# Patient Record
Sex: FEMALE | Race: WHITE
[De-identification: names, ages, dates, MRNs, and addresses within clinical notes are randomized per-mention and may not be internally consistent; named-entity substitution may affect disease eponyms.]

---

## 2019-04-23 ENCOUNTER — HOSPITAL ENCOUNTER (EMERGENCY)
Dept: HOSPITAL 62 - ER | Age: 44
Discharge: HOME | End: 2019-04-23
Payer: COMMERCIAL

## 2019-04-23 VITALS — SYSTOLIC BLOOD PRESSURE: 155 MMHG | DIASTOLIC BLOOD PRESSURE: 107 MMHG

## 2019-04-23 DIAGNOSIS — R14.0: ICD-10-CM

## 2019-04-23 DIAGNOSIS — M79.602: ICD-10-CM

## 2019-04-23 DIAGNOSIS — R07.9: Primary | ICD-10-CM

## 2019-04-23 DIAGNOSIS — R10.10: ICD-10-CM

## 2019-04-23 DIAGNOSIS — R10.13: ICD-10-CM

## 2019-04-23 LAB
ADD MANUAL DIFF: NO
ALBUMIN SERPL-MCNC: 4 G/DL (ref 3.5–5)
ALP SERPL-CCNC: 92 U/L (ref 38–126)
ALT SERPL-CCNC: 27 U/L (ref 9–52)
ANION GAP SERPL CALC-SCNC: 7 MMOL/L (ref 5–19)
APPEARANCE UR: CLEAR
APTT PPP: YELLOW S
AST SERPL-CCNC: 23 U/L (ref 14–36)
BASOPHILS # BLD AUTO: 0.1 10^3/UL (ref 0–0.2)
BASOPHILS NFR BLD AUTO: 0.7 % (ref 0–2)
BILIRUB DIRECT SERPL-MCNC: 0.2 MG/DL (ref 0–0.4)
BILIRUB SERPL-MCNC: 0.4 MG/DL (ref 0.2–1.3)
BILIRUB UR QL STRIP: NEGATIVE
BUN SERPL-MCNC: 16 MG/DL (ref 7–20)
CALCIUM: 10.4 MG/DL (ref 8.4–10.2)
CHLORIDE SERPL-SCNC: 107 MMOL/L (ref 98–107)
CO2 SERPL-SCNC: 25 MMOL/L (ref 22–30)
EOSINOPHIL # BLD AUTO: 0.1 10^3/UL (ref 0–0.6)
EOSINOPHIL NFR BLD AUTO: 0.7 % (ref 0–6)
ERYTHROCYTE [DISTWIDTH] IN BLOOD BY AUTOMATED COUNT: 13.3 % (ref 11.5–14)
GLUCOSE SERPL-MCNC: 114 MG/DL (ref 75–110)
GLUCOSE UR STRIP-MCNC: NEGATIVE MG/DL
HCT VFR BLD CALC: 37.3 % (ref 36–47)
HGB BLD-MCNC: 12.8 G/DL (ref 12–15.5)
KETONES UR STRIP-MCNC: NEGATIVE MG/DL
LIPASE SERPL-CCNC: 76.8 U/L (ref 23–300)
LYMPHOCYTES # BLD AUTO: 2.5 10^3/UL (ref 0.5–4.7)
LYMPHOCYTES NFR BLD AUTO: 25 % (ref 13–45)
MCH RBC QN AUTO: 32.3 PG (ref 27–33.4)
MCHC RBC AUTO-ENTMCNC: 34.3 G/DL (ref 32–36)
MCV RBC AUTO: 94 FL (ref 80–97)
MONOCYTES # BLD AUTO: 0.6 10^3/UL (ref 0.1–1.4)
MONOCYTES NFR BLD AUTO: 6.1 % (ref 3–13)
NEUTROPHILS # BLD AUTO: 6.8 10^3/UL (ref 1.7–8.2)
NEUTS SEG NFR BLD AUTO: 67.5 % (ref 42–78)
NITRITE UR QL STRIP: NEGATIVE
PH UR STRIP: 5 [PH] (ref 5–9)
PLATELET # BLD: 227 10^3/UL (ref 150–450)
POTASSIUM SERPL-SCNC: 4.2 MMOL/L (ref 3.6–5)
PROT SERPL-MCNC: 7.6 G/DL (ref 6.3–8.2)
PROT UR STRIP-MCNC: NEGATIVE MG/DL
RBC # BLD AUTO: 3.95 10^6/UL (ref 3.72–5.28)
SODIUM SERPL-SCNC: 138.8 MMOL/L (ref 137–145)
SP GR UR STRIP: 1.02
TOTAL CELLS COUNTED % (AUTO): 100 %
UROBILINOGEN UR-MCNC: NEGATIVE MG/DL (ref ?–2)
WBC # BLD AUTO: 10.1 10^3/UL (ref 4–10.5)

## 2019-04-23 PROCEDURE — 93005 ELECTROCARDIOGRAM TRACING: CPT

## 2019-04-23 PROCEDURE — 36415 COLL VENOUS BLD VENIPUNCTURE: CPT

## 2019-04-23 PROCEDURE — 83690 ASSAY OF LIPASE: CPT

## 2019-04-23 PROCEDURE — 71046 X-RAY EXAM CHEST 2 VIEWS: CPT

## 2019-04-23 PROCEDURE — 76705 ECHO EXAM OF ABDOMEN: CPT

## 2019-04-23 PROCEDURE — 85025 COMPLETE CBC W/AUTO DIFF WBC: CPT

## 2019-04-23 PROCEDURE — 81001 URINALYSIS AUTO W/SCOPE: CPT

## 2019-04-23 PROCEDURE — 84484 ASSAY OF TROPONIN QUANT: CPT

## 2019-04-23 PROCEDURE — 93010 ELECTROCARDIOGRAM REPORT: CPT

## 2019-04-23 PROCEDURE — 99284 EMERGENCY DEPT VISIT MOD MDM: CPT

## 2019-04-23 PROCEDURE — 80053 COMPREHEN METABOLIC PANEL: CPT

## 2019-04-23 NOTE — ER DOCUMENT REPORT
ED General





- General


Chief Complaint: Chest Pain


Stated Complaint: BACK PAIN, CHEST PAIN, LEFT ARM PAIN


Time Seen by Provider: 04/23/19 18:55


Mode of Arrival: Ambulatory


Notes: 





Patient is a 44-year-old female without chronic medical problems, does not take 

daily medications who presents with 2-3 days of epigastric abdominal discomfort 

intermittently radiating up into her central chest and into her mid back.  

Describes the pain as being a mild, throbbing, burning, fullness to the upper 

abdomen.  Seems to be worsened by food intake.  Has not trying to improve her 

symptoms.  Relatively unchanged since onset.  Significant other at the bedside 

does relate that she has had symptoms of gastritis and reflux many times in the 

past.  Does note that she has been drinking a lot of orange juice as she was 

recently sick with a viral upper respiratory infection and wanted to use the or 

she is to help treat this infection.  She states that she became concerned today

when she began having some left upper extremity discomfort as well prompting her

to come to the emergency department.  She has no known cardiac history.  No 

history of DVT or pulmonary embolus.  Non-smoker.  Symptoms have improved 

without intervention here in the emergency department.  Has not seen her general

physician regarding today's concerns.


TRAVEL OUTSIDE OF THE [place] IN LAST 30 DAYS: No





- Related Data


Allergies/Adverse Reactions: 


                                        





erythromycin base Allergy (Verified 04/23/19 15:32)


   











Past Medical History





- General


Information source: Patient





- Social History


Smoking Status: Never Smoker


Frequency of alcohol use: None


Drug Abuse: None


Lives with: Spouse/Significant other


Family History: Reviewed & Not Pertinent


Patient has suicidal ideation: No


Patient has homicidal ideation: No


Renal/ Medical History: Denies: Hx Peritoneal Dialysis


Past Surgical History: Reports: Hx Orthopedic Surgery - knee





Review of Systems





- Review of Systems


Notes: 





Constitutional: Negative for fever.


HENT: Negative for sore throat.


Eyes: Negative for visual changes.


Cardiovascular: Positive for chest pain.


Respiratory: Negative for shortness of breath.


Gastrointestinal: Positive for upper abdominal pain and bloating


Genitourinary: Negative for dysuria.


Musculoskeletal: Positive for left upper extremity pain


Skin: Negative for rash.


Neurological: Negative for headaches, weakness or numbness.





10 point ROS negative except as marked above and in HPI.





Physical Exam





- Vital signs


Vitals: 


                                        











Temp Pulse Resp BP Pulse Ox


 


 98.5 F   91   18   167/97 H  100 


 


 04/23/19 15:44  04/23/19 15:44  04/23/19 15:44  04/23/19 15:44  04/23/19 15:44











Interpretation: Hypertensive


Notes: 





PHYSICAL EXAMINATION:





GENERAL: Well-appearing, well-nourished and in no acute distress.





HEAD: Atraumatic, normocephalic.





EYES: Pupils equal round and reactive to light, extraocular movements intact, 

sclera anicteric, conjunctiva are normal.





ENT: nares patent, oropharynx clear without exudates.  Moist mucous membranes.





NECK: Normal range of motion, supple without lymphadenopathy





LUNGS: Breath sounds clear to auscultation bilaterally and equal.  No wheezes 

rales or rhonchi.





HEART: Regular rate and rhythm without murmurs





ABDOMEN: Soft, nontender, normoactive bowel sounds.  No guarding, no rebound.  

No masses appreciated.





EXTREMITIES: Normal range of motion, no pitting or edema.  No cyanosis.





NEUROLOGICAL: No focal neurological deficits. Moves all extremities 

spontaneously and on command.





PSYCH: Normal mood, normal affect.





SKIN: Warm, Dry, normal turgor, no rashes or lesions noted.





Course





- Re-evaluation


Re-evalutation: 





04/23/19 21:54


Patient presents with epigastric abdominal pain with associated reflux symptoms 

most consistent with likely gastritis.  Patient has also had some intermittent 

left arm pain which did increase concern for possible ACS presentation.  

Thankfully heart score is less than 3, 2- troponins 3 hours apart, EKG is 

normal.  Patient has no focal abdominal tenderness on examination.  Right upper 

quadrant ultrasound does not demonstrate any evidence of acute cholecystitis and

clinical history not consistent with symptomatic cholelithiasis.  Lipase is 

normal.  Chest x-ray is clear.  No LFT changes.  Based on history and exam, I do

not suspect ACS, pulmonary embolus, SBO, mesenteric ischemia, acute 

pancreatitis, biliary pathology, or an abdominal aortic dissection.  At this 

time will discharge with return precautions and follow-up recommendations.  

Verbal discharge instructions given a the bedside and opportunity for questions 

given. Medication warnings reviewed. Patient is in agreement with this plan and 

has verbalized understanding of return precautions and the need for primary care

follow-up in the next 24-72 hours.





- Vital Signs


Vital signs: 


                                        











Temp Pulse Resp BP Pulse Ox


 


 98.1 F   91   14   155/107 H  98 


 


 04/23/19 23:14  04/23/19 15:44  04/23/19 23:11  04/23/19 23:11  04/23/19 23:11














- Laboratory


Result Diagrams: 


                                 04/23/19 19:25





                                 04/23/19 19:25


Laboratory results interpreted by me: 


                                        











  04/23/19 04/23/19





  19:25 19:25


 


Glucose  114 H 


 


Calcium  10.4 H 


 


Ur Leukocyte Esterase   TRACE H














- Diagnostic Test


Radiology reviewed: Image reviewed, Reports reviewed


Radiology results interpreted by me: 





04/23/19 21:55


Chest x-ray: No acute infiltrate or pneumothorax





- EKG Interpretation by Me


Additional EKG results interpreted by me: 





04/23/19 21:57


Sinus tachycardia, rate 105.  No ST elevations or depressions.  QTC is 466.





Discharge





- Discharge


Clinical Impression: 


 Upper abdominal pain, Left arm pain, Chest discomfort





Condition: Good


Disposition: HOME, SELF-CARE


Additional Instructions: 


Your symptoms appear to be most consistent with stomach or upper intestinal 

irritation.   Please begin taking famotidine 40 mg in the morning and 40 mg at 

night.  Take Carafate prior to meals.  You may also take medicine such as Pepto-

Bismol or Tums to assist with your pain.  Please return to emergency department 

immediately if you have worsening of your pain, shortness of breath, vomiting, 

become unable to exert yourself due to pain or difficulty breathing, you pass 

out, or have any pain that radiates into your arms, jaw, or back. Please also 

return if you have any additional symptoms that are concerning to you.





As we have discussed, the most important thing is lifestyle changes.  You need 

to avoid smoking, sodas, tea, coffee, alcohol, spicy foods, and acidic foods 

such as citrus fruits, tomato based products, berries, and most fruit juices. 


Prescriptions: 


Famotidine 40 mg PO BID #60 tablet


Sucralfate [Carafate 1 gm Tablet] 1 gm PO ACHS #120 tablet

## 2019-04-23 NOTE — RADIOLOGY REPORT (SQ)
EXAM DESCRIPTION: 



US ABDOMEN LIMITED



COMPLETED DATE/TME:  04/23/2019 18:56



CLINICAL HISTORY: 



44 years, Female, EPIGASTRIC PAIN



COMPARISON:

None.



TECHNIQUE:

Abdominal ultrasound was performed.



LIMITATIONS:

None.



FINDINGS:



Visualized portions of the pancreas appear normal in

echogenicity.



Abdominal aorta appears normal with measurements as follows:

Proximal abdominal aorta: 2.2 cm

Mid abdominal aorta: 1.6 cm

Distal abdominal aorta: 1.5 cm



The liver is diffusely echogenic. It measures 15 cm in length.

Antegrade flow is documented within the main portal vein.



Right kidney measures 9.5 cm in length. No hydronephrosis.



Gallbladder wall thickness measures 2 mm. Echogenic material

appears to layer dependently within the gallbladder lumen.

Sonographic Mc sign was negative. Common bile duct diameter

measures 5 mm.



IMPRESSION:



Suspected gallbladder sludge. Otherwise, no sonographic evidence

of acute cholecystitis.



Echogenic liver, suggestive of hepatic steatosis.

 



copyright 2011 [employer]- All Rights Reserved

## 2019-04-23 NOTE — EKG REPORT
SEVERITY:- OTHERWISE NORMAL ECG -

SINUS TACHYCARDIA

:

Confirmed by: Carlos German MD 23-Apr-2019 17:25:15

## 2019-04-23 NOTE — RADIOLOGY REPORT (SQ)
EXAM DESCRIPTION:  CHEST 2 VIEWS



COMPLETED DATE/TIME:  4/23/2019 7:13 pm



REASON FOR STUDY:  CHEST PAIN



COMPARISON:  None.



EXAM PARAMETERS:  NUMBER OF VIEWS: two views

TECHNIQUE: Digital Frontal and Lateral radiographic views of the chest acquired.

RADIATION DOSE: NA

LIMITATIONS: none



FINDINGS:  LUNGS AND PLEURA: No opacities, masses or pneumothorax. No pleural effusion.

MEDIASTINUM AND HILAR STRUCTURES: No masses or contour abnormalities.

HEART AND VASCULAR STRUCTURES: Heart normal size.  No evidence for failure.

BONES: No acute findings.

HARDWARE: None in the chest.

OTHER: No other significant finding.



IMPRESSION:  NO ACUTE RADIOGRAPHIC FINDING IN THE CHEST.



TECHNICAL DOCUMENTATION:  JOB ID:  [id number]

 2011 [employer]- All Rights Reserved



Reading location - IP/workstation name: [email or web address]

## 2019-04-23 NOTE — ER DOCUMENT REPORT
ED Medical Screen (RME)





- General


Chief Complaint: Chest Pain


Stated Complaint: BACK PAIN, CHEST PAIN, LEFT ARM PAIN


Time Seen by Provider: 04/23/19 18:55


Mode of Arrival: Ambulatory


Information source: Patient


TRAVEL OUTSIDE OF THE [place] IN LAST 30 DAYS: No





- HPI


Patient complains to provider of: CHEST PAIN, ABDO PAIN


Notes: 





04/23/19 18:59


Patient with complaints of chest pain and upper abdominal pain.  The pain has 

been present for about a week.  States the pain is whenever she eats and in her 

epigastric area and radiates into her left upper shoulder area and chest.  No 

shortness of breath.  No fever.  She has had nausea vomiting.  No prior 

abdominal surgeries.  She does have a prior history of an ulcer.  She is a non-s

moker.  No history of hypertension, hyper cholesterol, diabetes, CAD.  No drug 

use.





Exam


Nontoxic, no distress.  Epigastric tenderness to palpation.  Lungs clear 

throughout.  Heart sounds normal.





Plan


CBC, CMP, lipase, troponin, EKG, chest x-ray, ultrasound of the gallbladder.





An initial examination was made on the patient as part of the triage process, 

and it was determined a more comprehensive evaluation was necessary. Initial 

labs were ordered and patient was transferred to another provider in the ED who 

assumed care and finished evaluation and plan.








- Related Data


Allergies/Adverse Reactions: 


                                        





erythromycin base Allergy (Verified 04/23/19 15:32)


   











Past Medical History


Renal/ Medical History: Denies: Hx Peritoneal Dialysis


Past Surgical History: Reports: Hx Orthopedic Surgery - knee





Physical Exam





- Vital signs


Vitals: 





                                        











Temp Pulse Resp BP Pulse Ox


 


 98.5 F   91   18   167/97 H  100 


 


 04/23/19 15:44  04/23/19 15:44  04/23/19 15:44  04/23/19 15:44  04/23/19 15:44














Course





- Vital Signs


Vital signs: 





                                        











Temp Pulse Resp BP Pulse Ox


 


 98.5 F   91   18   167/97 H  100 


 


 04/23/19 15:44  04/23/19 15:44  04/23/19 15:44  04/23/19 15:44  04/23/19 15:44

## 2020-07-31 ENCOUNTER — HOSPITAL ENCOUNTER (EMERGENCY)
Dept: HOSPITAL 62 - ER | Age: 45
Discharge: HOME | End: 2020-07-31
Payer: SELF-PAY

## 2020-07-31 VITALS — DIASTOLIC BLOOD PRESSURE: 94 MMHG | SYSTOLIC BLOOD PRESSURE: 159 MMHG

## 2020-07-31 DIAGNOSIS — R10.812: ICD-10-CM

## 2020-07-31 DIAGNOSIS — R10.2: ICD-10-CM

## 2020-07-31 DIAGNOSIS — R11.2: ICD-10-CM

## 2020-07-31 DIAGNOSIS — M79.605: ICD-10-CM

## 2020-07-31 DIAGNOSIS — R31.9: ICD-10-CM

## 2020-07-31 DIAGNOSIS — N13.2: Primary | ICD-10-CM

## 2020-07-31 DIAGNOSIS — R10.814: ICD-10-CM

## 2020-07-31 DIAGNOSIS — R10.9: ICD-10-CM

## 2020-07-31 LAB
ADD MANUAL DIFF: NO
ALBUMIN SERPL-MCNC: 4.2 G/DL (ref 3.5–5)
ALP SERPL-CCNC: 101 U/L (ref 38–126)
ANION GAP SERPL CALC-SCNC: 6 MMOL/L (ref 5–19)
APPEARANCE UR: (no result)
APTT PPP: YELLOW S
AST SERPL-CCNC: 44 U/L (ref 14–36)
BASOPHILS # BLD AUTO: 0 10^3/UL (ref 0–0.2)
BASOPHILS NFR BLD AUTO: 0.6 % (ref 0–2)
BILIRUB DIRECT SERPL-MCNC: 0 MG/DL (ref 0–0.4)
BILIRUB SERPL-MCNC: 0.4 MG/DL (ref 0.2–1.3)
BILIRUB UR QL STRIP: NEGATIVE
BUN SERPL-MCNC: 13 MG/DL (ref 7–20)
CALCIUM: 10.4 MG/DL (ref 8.4–10.2)
CHLORIDE SERPL-SCNC: 106 MMOL/L (ref 98–107)
CO2 SERPL-SCNC: 28 MMOL/L (ref 22–30)
EOSINOPHIL # BLD AUTO: 0.1 10^3/UL (ref 0–0.6)
EOSINOPHIL NFR BLD AUTO: 2.2 % (ref 0–6)
ERYTHROCYTE [DISTWIDTH] IN BLOOD BY AUTOMATED COUNT: 12.7 % (ref 11.5–14)
GLUCOSE SERPL-MCNC: 133 MG/DL (ref 75–110)
GLUCOSE UR STRIP-MCNC: NEGATIVE MG/DL
HCT VFR BLD CALC: 40 % (ref 36–47)
HGB BLD-MCNC: 13.6 G/DL (ref 12–15.5)
KETONES UR STRIP-MCNC: NEGATIVE MG/DL
LYMPHOCYTES # BLD AUTO: 1.9 10^3/UL (ref 0.5–4.7)
LYMPHOCYTES NFR BLD AUTO: 27.7 % (ref 13–45)
MCH RBC QN AUTO: 32.4 PG (ref 27–33.4)
MCHC RBC AUTO-ENTMCNC: 34.1 G/DL (ref 32–36)
MCV RBC AUTO: 95 FL (ref 80–97)
MONOCYTES # BLD AUTO: 0.5 10^3/UL (ref 0.1–1.4)
MONOCYTES NFR BLD AUTO: 7.3 % (ref 3–13)
NEUTROPHILS # BLD AUTO: 4.2 10^3/UL (ref 1.7–8.2)
NEUTS SEG NFR BLD AUTO: 62.2 % (ref 42–78)
NITRITE UR QL STRIP: NEGATIVE
PH UR STRIP: 7 [PH] (ref 5–9)
PLATELET # BLD: 229 10^3/UL (ref 150–450)
POTASSIUM SERPL-SCNC: 4.4 MMOL/L (ref 3.6–5)
PROT SERPL-MCNC: 7.7 G/DL (ref 6.3–8.2)
PROT UR STRIP-MCNC: 30 MG/DL
RBC # BLD AUTO: 4.2 10^6/UL (ref 3.72–5.28)
SP GR UR STRIP: 1.01
TOTAL CELLS COUNTED % (AUTO): 100 %
UROBILINOGEN UR-MCNC: NEGATIVE MG/DL (ref ?–2)
WBC # BLD AUTO: 6.7 10^3/UL (ref 4–10.5)

## 2020-07-31 PROCEDURE — 36415 COLL VENOUS BLD VENIPUNCTURE: CPT

## 2020-07-31 PROCEDURE — 85025 COMPLETE CBC W/AUTO DIFF WBC: CPT

## 2020-07-31 PROCEDURE — 80053 COMPREHEN METABOLIC PANEL: CPT

## 2020-07-31 PROCEDURE — 96374 THER/PROPH/DIAG INJ IV PUSH: CPT

## 2020-07-31 PROCEDURE — 96375 TX/PRO/DX INJ NEW DRUG ADDON: CPT

## 2020-07-31 PROCEDURE — 84703 CHORIONIC GONADOTROPIN ASSAY: CPT

## 2020-07-31 PROCEDURE — 96361 HYDRATE IV INFUSION ADD-ON: CPT

## 2020-07-31 PROCEDURE — 81001 URINALYSIS AUTO W/SCOPE: CPT

## 2020-07-31 PROCEDURE — 99284 EMERGENCY DEPT VISIT MOD MDM: CPT

## 2020-07-31 PROCEDURE — 83690 ASSAY OF LIPASE: CPT

## 2020-07-31 PROCEDURE — 74176 CT ABD & PELVIS W/O CONTRAST: CPT

## 2020-07-31 NOTE — RADIOLOGY REPORT (SQ)
EXAM DESCRIPTION:  CT ABD/PELVIS NO ORAL OR IV



IMAGES COMPLETED DATE/TIME:  7/31/2020 9:49 am



REASON FOR STUDY:  Left flank pain, eval for kidney stone



COMPARISON:  None.



TECHNIQUE:  CT scan of the abdomen and pelvis performed without intravenous or oral contrast. Images 
reviewed with lung, soft tissue, and bone windows. Reconstructed coronal and sagittal MPR images revi
ewed. All images stored on PACS.

All CT scanners at this facility use dose modulation, iterative reconstruction, and/or weight based d
osing when appropriate to reduce radiation dose to as low as reasonably achievable (ALARA).

[facility]: Dose Right  [facility]: CareDose    [facility]: Dose Right    [facility]: Teradose 4D    [facility]: Smart Headstrong



RADIATION DOSE:  CT Rad equipment meets quality standard of care and radiation dose reduction techniq
ues were employed. CTDIvol: 7.4 mGy. DLP: 385 mGy-cm.mGy.



LIMITATIONS:  None.



FINDINGS:  LOWER CHEST: No significant findings. No nodules or infiltrates.

NON-CONTRASTED LIVER, SPLEEN, ADRENALS: Evaluation limited by lack of IV contrast. No identified sign
ificant masses.

PANCREAS: No masses. No peripancreatic inflammatory changes.

GALLBLADDER: No identified stones by CT criteria. No inflammatory changes to suggest cholecystitis.

RIGHT KIDNEY AND URETER: No solid masses. No significant calcification. No hydronephrosis or hydroure
ter.

LEFT KIDNEY AND URETER: Perinephric stranding with slight hydronephrosis and proximal ureteral dilata
tion due to a 3 mm stone in the proximal ureter.

AORTA AND RETROPERITONEUM: No aneurysm. No retroperitoneal masses or adenopathy.

BOWEL AND PERITONEAL CAVITY: No obvious masses or inflammatory changes. No free fluid.

APPENDIX: Not visualized.

PELVIS, BLADDER, AND ABDOMINAL WALL:No abnormal masses. No free fluid. Bladder normal.

BONES: No significant findings.

OTHER: No other significant finding.



IMPRESSION:

1. Mild left obstructive uropathy due to proximal ureteral stone measuring close to 3 mm.



TECHNICAL DOCUMENTATION:  JOB ID:  [id number]

Quality ID # [id number]: Final reports with documentation of one or more dose reduction techniques (e.g., Au
tomated exposure control, adjustment of the mA and/or kV according to patient size, use of iterative 
reconstruction technique)

 2011 [employer]- All Rights Reserved



Reading location - IP/workstation name: [email or web address]

## 2020-07-31 NOTE — ER DOCUMENT REPORT
ED General





- General


Chief Complaint: Possible Kidney Stone


Stated Complaint: ABDOMINAL PAIN


Time Seen by Provider: 07/31/20 08:34


TRAVEL OUTSIDE OF THE [place] IN LAST 30 DAYS: No





- HPI


Notes: 





Patient is a 45-year-old female presents emergency department for evaluation of 

left leg pain.  This started at 7:00 this morning.  She was sleeping, woke her 

from sleep.  She is had nausea with a few episodes of emesis.  Pain is sharp and

stabbing, radiates around into her left lower pelvis.  She said no fevers or 

chills.  No gross hematuria or urinary frequency.  She is never had symptoms 

similar to this in the past.





- Related Data


Allergies/Adverse Reactions: 


                                        





erythromycin base Allergy (Verified 04/23/19 15:32)


   








Home Medications: None





Past Medical History





- General


Information source: Patient





- Social History


Smoking Status: Never Smoker


Frequency of alcohol use: None


Drug Abuse: None


Family History: CAD, Malignancy





- Medical History


Medical History: Negative


Renal/ Medical History: Denies: Hx Peritoneal Dialysis


Past Surgical History: Reports: Hx Orthopedic Surgery - Bilateral knee 

arthroscopy





Review of Systems





- Review of Systems


Gastrointestinal: See HPI


Genitourinary: See HPI


-: Yes All other systems reviewed and negative





Physical Exam





- Vital signs


Vitals: 


                                        











Temp Pulse Resp BP Pulse Ox


 


 97.9 F   74   18   148/82 H  98 


 


 07/31/20 07:50  07/31/20 07:50  07/31/20 07:50  07/31/20 07:50  07/31/20 07:50














- Notes


Notes: 





Is a 45-year-old female who appears her stated age in a moderate amount of 

distress.  She is intermittently vomiting, nonbloody, nonbilious emesis.  Vital 

signs reviewed, please refer to chart. Head is normocephalic, atraumatic.  

Pupils equal round, reactive to light.  Neck is supple without meningismus.  

Heart is regular rate and rhythm.  Lungs are clear to auscultation bilaterally. 

Abdomen is soft, moderately tender in the left upper and left lower quadrants 

without rebound or guarding, normoactive bowel sounds throughout.  Positive CVA 

tenderness on the left.  Extremities without cyanosis, clubbing. Posterior 

calves are nontender.  Peripheral pulses are equal.  Skin is warm and dry.  Lamont narvaez is awake, alert, neurological exam is nonfocal.





Course





- Re-evaluation


Re-evalutation: 





07/31/20 09:09


Patient presents to the emergency department for evaluation of left flank pain. 

She had laboratory investigations as ordered through protocol.  She is showing 

signs of renal colic.  She has large blood in her urine.  Patient will be sent 

for CT scan of the abdomen pelvis to evaluate for stone.  She will be given 

Toradol, morphine, Zofran, IV fluids.  She is currently stable, we will continue

to monitor.


07/31/20 10:29


Patient feeling significantly improved.  CT scan reveals 3 mm proximal ureteral 

stone.  Mild hydronephrosis noted.  Will give first dose of Flomax, send with 

prescription for symptomatic medications and Flomax as well.  She is to follow-

up with primary care, return to the ED with worsening.





- Vital Signs


Vital signs: 


                                        











Temp Pulse Resp BP Pulse Ox


 


 97.9 F   74   18   148/82 H  98 


 


 07/31/20 07:50  07/31/20 07:50  07/31/20 07:50  07/31/20 07:50  07/31/20 07:50














- Laboratory


Result Diagrams: 


                                 07/31/20 08:25





                                 07/31/20 08:25


Laboratory results interpreted by me: 


                                        











  07/31/20 07/31/20





  08:25 08:25


 


Glucose  133 H 


 


Calcium  10.4 H 


 


AST  44 H 


 


ALT  60 H 


 


Urine Protein   30 H


 


Urine Blood   LARGE H














- Diagnostic Test


Radiology reviewed: Reports reviewed


Radiology results interpreted by me: 





07/31/20 10:29





                                        





Abdomen/Pelvis CT  07/31/20 09:07


IMPRESSION:


1. Mild left obstructive uropathy due to proximal ureteral stone measuring close

to 3 mm.


 














Discharge





- Discharge


Clinical Impression: 


 Ureterolithiasis





Hydronephrosis


Qualifiers:


 Hydronephrosis type: with ureteral calculous obstruction Qualified Code(s): 

N13.2 - Hydronephrosis with renal and ureteral calculous obstruction





Condition: Stable


Disposition: HOME, SELF-CARE


Instructions:  Kidney Stone ([facility])


Additional Instructions: 


Rest, stay well-hydrated.  Take medications as prescribed.  Watch for dizziness 

with Flomax, get up slowly.  Watch for dizziness, drowsiness, constipation with 

the Norco.  Follow-up with primary care this week.  If you develop fevers, worse

reza pain, or any other new or concerning symptoms, please return immediately to

the emergency department for evaluation.